# Patient Record
(demographics unavailable — no encounter records)

---

## 2025-04-24 NOTE — ASSESSMENT
[Long Term Vascular Complications] : long term vascular complications of diabetes [Carbohydrate Consistent Diet] : carbohydrate consistent diet [Importance of Diet and Exercise] : importance of diet and exercise to improve glycemic control, achieve weight loss and improve cardiovascular health [FreeTextEntry1] : PTC Unknown initial staging, although from history and recent LT4 reduction, she appears to have no evidence of recurrence post TT and JAMIL course. obtain collateral info. If on remission, reduce LT4 to target TSH of ~1-3 given maternal history of OP. appears clinically euthyroid. TGB undetectable as of 2024, US 2023 w/o eveidence of locorregional recurrence., repeat in 8-12 months.  pituiatry microadenoma hirsutism, nephrolithiasis may be s/s of end organ damage from subclinical cushing. pituitary labs and 24 hour UFC wnl in 2022, obtain collateral info from past Endo. No s/s of intracranial mass effect  Obesity, Morbid: Class I, complicated by HTN. High risk of metabolic syndrome and future complications due to medical comorbidities. Discussed options including meds, bariatric surgery and lifestyle modification. RB and alternatives discussed. Questions answered and she verbalized understanding. Refer to nutrition and start hypocaloric, hypocarb diet in addition to exercise regimen. Refer to  now. Some weight loss (3 lbs). as no 5-7% weight loss observed on f/u, will continue plenity for appetite control. advised to discuss w/ ortho alternatives to gabapentin for pain control.  hirsutism self reported, no s/s of hyperandrogenism at this time. advised to not take spironolactone given lack of clinical benefit for skin decoloration and risk of hyperkalemia. Verbalized understanding and agrees with treatment plan, will contact MD and seek emergency medical care if condition changes.

## 2025-04-24 NOTE — HISTORY OF PRESENT ILLNESS
[FreeTextEntry1] : 57 y/o F w/ Hx of HTN, HLD, pituitary microadenoma and PTC s/p TT Initial evaluation and management of several issues generally feels well and endorses no acute complaints. reports cc of a history of PTC, diagnosed in 2016, reports MNG for many years w/ several previously benign biopsies, however given progressive growth, she underwent elective TT. Path showed multiple foci of carcinoma, no collateral info is available for review. she notes she subsequently received an unspecified amount of JAMIL for treatment. Post op surveillance since has been unremarkable, last US and TGB were self reported as normal as of 9/2020. She also reports a history of nephrolithiasis (recurrent), for which she takes kChlor suppplements. Recently saw a Dermatologist who started spironolactone "for skin whitening" per the patient. She has not started taking it as of yet. she endorses chin coarse hair growth. Notes a history of a pituitary microadenoma, states she received 2 pills for it, only remembers Cabergoline, which was stopped w/ menopause.    4/2025 Here for /fu, generally feels well and endorses no acute complaints. No interval events since LV. Today reports stable weight, adherent to LT4 but has not been using plenity as instructed. notes some earliy satiety w/ Plenity but otherwise no other digestive s/e. no emesis of undigested food. She otherwise denies any f/c, CP, SOB, palpitations, tremors, depressed mood, anxiety, palpitations, n/v, stool/urinary abn, skin/weight changes, heat/cold intolerance, HAs, breast/nipple changes, polyuria/polydipsia/nocturia or other complaints. she again denies any dysphagia, hoarseness, neck tenderness or new palpable masses. she again denies any family history of thyroid disorders.

## 2025-04-24 NOTE — PHYSICAL EXAM
[Alert] : alert [Well Nourished] : well nourished [No Acute Distress] : no acute distress [Well Developed] : well developed [Normal Sclera/Conjunctiva] : normal sclera/conjunctiva [EOMI] : extra ocular movement intact [No Proptosis] : no proptosis [Normal Oropharynx] : the oropharynx was normal [Thyroid Not Enlarged] : the thyroid was not enlarged [No Thyroid Nodules] : no palpable thyroid nodules [No Respiratory Distress] : no respiratory distress [No Accessory Muscle Use] : no accessory muscle use [Clear to Auscultation] : lungs were clear to auscultation bilaterally [Normal S1, S2] : normal S1 and S2 [Normal Rate] : heart rate was normal [Regular Rhythm] : with a regular rhythm [No Edema] : no peripheral edema [Pedal Pulses Normal] : the pedal pulses are present [Normal Bowel Sounds] : normal bowel sounds [Not Tender] : non-tender [Not Distended] : not distended [Soft] : abdomen soft [Normal Anterior Cervical Nodes] : no anterior cervical lymphadenopathy [No Spinal Tenderness] : no spinal tenderness [Spine Straight] : spine straight [No Stigmata of Cushings Syndrome] : no stigmata of Cushings Syndrome [Normal Gait] : normal gait [Normal Strength/Tone] : muscle strength and tone were normal [No Rash] : no rash [Normal Reflexes] : deep tendon reflexes were 2+ and symmetric [No Tremors] : no tremors [Oriented x3] : oriented to person, place, and time [Acanthosis Nigricans] : no acanthosis nigricans [de-identified] : no hirsutism or androgenic alopecia apparent [de-identified] : well healed scar